# Patient Record
Sex: MALE | Race: WHITE | NOT HISPANIC OR LATINO | Employment: UNEMPLOYED | URBAN - METROPOLITAN AREA
[De-identification: names, ages, dates, MRNs, and addresses within clinical notes are randomized per-mention and may not be internally consistent; named-entity substitution may affect disease eponyms.]

---
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
## 2025-01-29 ENCOUNTER — TELEPHONE (OUTPATIENT)
Dept: PEDIATRICS | Facility: CLINIC | Age: 20
End: 2025-01-29
Payer: COMMERCIAL

## 2025-01-29 NOTE — LETTER
January 29, 2025    Hal Beltran  [address]  [place] [address]             [facility]  Pediatrics  [address]  [facility] [address]  Phone: [phone]  Fax: [phone]   January 29, 2025     Patient: Hal Beltran   YOB: 2005   Date of Visit: 1/29/2025       To Whom it May Concern:    Hal Beltran is under our care at [facility] and is not taking any current medications. He has not had any medications since 2021.     If you have any questions or concerns, please don't hesitate to call.    Sincerely,         Gregoria Vincent MD/ Estela Grover LPN

## 2025-01-29 NOTE — TELEPHONE ENCOUNTER
----- Message from Nurse Toy sent at 1/29/2025 11:57 AM CST -----  Contact: Parent    ----- Message -----  From: Clarice Yanes  Sent: 1/29/2025  11:55 AM CST  To: Carlton BLAKE ([facility]) Staff    Type:  Patient Returning Call    Who Called:Patient    Who Left Message for Patient: Rosa    Does the patient know what this is regarding?:Yes    Would the patient rather a call back or a response via [facility]? Call    Best Call Back Number:[phone]    Additional Information: Please return call   \

## 2025-01-29 NOTE — TELEPHONE ENCOUNTER
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 Also needs a copy of list medications he has been on for the last 4 years.   Would the patient rather a call back or a response via [facility]? call  Best Call Back Number:   Additional Information: please advise and thank you.

## 2025-03-20 ENCOUNTER — TELEPHONE (OUTPATIENT)
Dept: PEDIATRICS | Facility: CLINIC | Age: 20
End: 2025-03-20
Payer: COMMERCIAL

## 2025-03-20 NOTE — TELEPHONE ENCOUNTER
"Called and spoke with Dad.  He stated patient is wanting to join the [employer].  Received a letter from us stating he has not been on a medication since 2021 however [employer] is requesting more information on what medication he was on and why.  Asking for a letter stating we cannot give out information on patients under the age of 18.    Advised Dad we are unable to give out any information without a signed medical release form.  With patient being over the age of 18, he will need to sign the release.  Without a signed consent, we are unable to give any information.  I cannot even tell him (Dad) anything without a signed consent from patient.    Patient (on speaker phone) said the  just needs a paper saying we cannot give out his behavior health records.  Asked if we can just put that in letter form.    Advised both patient and parent again, that is correct, cannot give out any information at all without a signed consent.    Asked if we can put that in letter form and omit "without a signed consent".    Advised will write a quick note and send to patient via portal.    Both patient and parent VU.    Letter written and sent in portal as I advised.  "

## 2025-03-20 NOTE — TELEPHONE ENCOUNTER
Dad called and needs a letter stating that we can not release any psychiatry notes from when he a juvenile. He is trying to get accepted into the  but needs proof because of previous medication for depression. Letter written previously saying he is not on any medication since 2021.  wants more detail          [phone] (John Beltran)

## 2025-03-20 NOTE — LETTER
March 20, 2025    Hal Beltran  [address]  [place] [address]             [facility] - Pediatric[facility]  [address] [facility] [address]  Phone: [phone]  Fax: [phone] To Whom It May Concern:    We are unable to share any behavior health records regarding the above named patient.    Sincerely,    Gregoria Vincent MD / Carole NG LPN Lead

## 2025-08-28 ENCOUNTER — HOSPITAL ENCOUNTER (OUTPATIENT)
Dept: RADIOLOGY | Facility: HOSPITAL | Age: 20
Discharge: HOME | End: 2025-08-28
Attending: STUDENT IN AN ORGANIZED HEALTH CARE EDUCATION/TRAINING PROGRAM
Payer: COMMERCIAL

## 2025-08-28 ENCOUNTER — OFFICE VISIT (OUTPATIENT)
Dept: FAMILY MEDICINE | Facility: CLINIC | Age: 20
End: 2025-08-28
Payer: COMMERCIAL

## 2025-08-28 VITALS
HEART RATE: 102 BPM | SYSTOLIC BLOOD PRESSURE: 156 MMHG | OXYGEN SATURATION: 100 % | HEIGHT: 74 IN | BODY MASS INDEX: 27.56 KG/M2 | DIASTOLIC BLOOD PRESSURE: 96 MMHG | WEIGHT: 214.75 LBS

## 2025-08-28 DIAGNOSIS — M54.50 ACUTE BILATERAL LOW BACK PAIN, UNSPECIFIED WHETHER SCIATICA PRESENT: ICD-10-CM

## 2025-08-28 DIAGNOSIS — Z01.00 ROUTINE EYE EXAM: ICD-10-CM
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
## 2025-08-28 PROCEDURE — 1159F MED LIST DOCD IN RCRD: CPT | Mod: CPTII,S$GLB,, | Performed by: STUDENT IN AN ORGANIZED HEALTH CARE EDUCATION/TRAINING PROGRAM

## 2025-08-28 PROCEDURE — 3077F SYST BP >= 140 MM HG: CPT | Mod: CPTII,S$GLB,, | Performed by: STUDENT IN AN ORGANIZED HEALTH CARE EDUCATION/TRAINING PROGRAM

## 2025-08-28 PROCEDURE — 3080F DIAST BP >= 90 MM HG: CPT | Mod: CPTII,S$GLB,, | Performed by: STUDENT IN AN ORGANIZED HEALTH CARE EDUCATION/TRAINING PROGRAM

## 2025-08-28 PROCEDURE — 72114 X-RAY EXAM L-S SPINE BENDING: CPT | Mod: TC,PO

## 2025-08-28 PROCEDURE — 99999 PR PBB SHADOW E&M-EST. PATIENT-LVL IV: CPT | Mod: PBBFAC,,, | Performed by: STUDENT IN AN ORGANIZED HEALTH CARE EDUCATION/TRAINING PROGRAM

## 2025-08-28 PROCEDURE — 3008F BODY MASS INDEX DOCD: CPT | Mod: CPTII,S$GLB,, | Performed by: STUDENT IN AN ORGANIZED HEALTH CARE EDUCATION/TRAINING PROGRAM

## 2025-08-28 PROCEDURE — 99204 OFFICE O/P NEW MOD 45 MIN: CPT | Mod: S$GLB,,, | Performed by: STUDENT IN AN ORGANIZED HEALTH CARE EDUCATION/TRAINING PROGRAM

## 2025-08-28 RX ORDER — NAPROXEN 500 MG/1
500 TABLET ORAL 2 TIMES DAILY WITH MEALS
Qty: 28 TABLET | Refills: 0 | Status: SHIPPED | OUTPATIENT
Start: 2025-08-28 | End: 2025-09-11

## 2025-09-04 ENCOUNTER — OFFICE VISIT (OUTPATIENT)
Dept: OPTOMETRY | Facility: CLINIC | Age: 20
End: 2025-09-04
Payer: COMMERCIAL

## 2025-09-04 DIAGNOSIS — Z01.00 EXAMINATION OF EYES AND VISION: ICD-10-CM

## 2025-09-04 DIAGNOSIS — H52.223 REGULAR ASTIGMATISM OF BOTH EYES: ICD-10-CM
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
## 2025-09-04 PROCEDURE — 99999 PR PBB SHADOW E&M-EST. PATIENT-LVL III: CPT | Mod: PBBFAC,,,
